# Patient Record
(demographics unavailable — no encounter records)

---

## 2024-11-25 NOTE — CONSULT LETTER
[Dear  ___] : Dear  [unfilled], [Consult Letter:] : I had the pleasure of evaluating your patient, [unfilled]. [Please see my note below.] : Please see my note below. [Consult Closing:] : Thank you very much for allowing me to participate in the care of this patient.  If you have any questions, please do not hesitate to contact me. [Sincerely,] : Sincerely, [FreeTextEntry3] : Tamela Anderson MD Pediatric Endocrinologist Division of Pediatric Endocrinology  [facility] Maternal Fetal Health and Pediatric Specialists at [facility]

## 2024-11-25 NOTE — CONSULT LETTER
[Dear  ___] : Dear  [unfilled], [Consult Letter:] : I had the pleasure of evaluating your patient, [unfilled]. [Please see my note below.] : Please see my note below. [Consult Closing:] : Thank you very much for allowing me to participate in the care of this patient.  If you have any questions, please do not hesitate to contact me. [Sincerely,] : Sincerely, [FreeTextEntry3] : Tamela Anderson MD Pediatric Endocrinologist Division of Pediatric Endocrinology  [facility] Maternal Fetal Health and Pediatric Specialists at [facility]

## 2024-11-25 NOTE — FAMILY HISTORY
[TextEntry] : Father: 48 years old. 5ft9in, 165 lb; dad thinks he had puberty around 13/14 years of age and felt late compared to friends; no PMHx Mother: 47 years old, 5ft4in, 120 lb; menarche at 15yol no PMHx Sister: 8 years old; ADD, seasonal allergies MPH 69in +/- 4in  MGM 5ft7in- 14/15 years when had menarche MGF [id number] (at peak was 6ft) Cora x2: 6ft (oldest), 5ft9in (second oldest) PGM 5ft2in PGF 5ft6in PUncle (older) 5ft7/5ft8in PUncle (younger) 5ft6in  Diabetes: MGM dx at 80 years of age Short Stature: paternal grandparents High Cholesterol: PGF Cancer: MGF Heart disease: PGF Infertility: Paternal grandparents, parents Weight loss surgery: PGM- had gastric lap band Cardiac issues: PGF has a pacemaker  There is no known past medical history of thyroid disease, early puberty, late puberty, disorders of calcium, other hormone conditions, obesity, hypertension, bone disease, liver disease, kidney disease, lung disease, neurological disease (including stroke), birth defects, PCOS, celiac disease, pituitary disease, NAFLD, lupus, RA, Crohn's disease/Ulcerative colitis, MI, COLIN, blood clots, adrenal problems, osteoporosis, frequent fractures.

## 2024-11-25 NOTE — SOCIAL HISTORY
[TextEntry] : - Lives with mom, dad, and sister - He is in 6th grade - Paternal grandfather, Carlita Quiñones, is a retired pediatrician with [employer]

## 2024-11-25 NOTE — FAMILY HISTORY
[TextEntry] : Father: 48 years old. 5ft9in, 165 lb; dad thinks he had puberty around 13/14 years of age and felt late compared to friends; no PMHx Mother: 47 years old, 5ft4in, 120 lb; menarche at 15yol no PMHx Sister: 8 years old; ADD, seasonal allergies MPH 69in +/- 4in  MGM 5ft7in- 14/15 years when had menarche MGF [id number] (at peak was 6ft) Cora x2: 6ft (oldest), 5ft9in (second oldest) PGM 5ft2in PGF 5ft6in PUncle (older) 5ft7/5ft8in PUncle (younger) 5ft6in  Diabetes: MGM dx at 80 years of age Short Stature: paternal grandparents High Cholesterol: PGF Cancer: MGF Heart disease: PGF Infertility: Paternal grandparents, parents Weight loss surgery: PGM- had gastric lap band Cardiac issues: PGF has a pacemaker  There is no known past medical history of thyroid disease, early puberty, late puberty, disorders of calcium, other hormone conditions, obesity, hypertension, bone disease, liver disease, kidney disease, lung disease, neurological disease (including stroke), birth defects, PCOS, celiac disease, pituitary disease, NAFLD, lupus, RA, Crohn's disease/Ulcerative colitis, MI, COLIN, blood clots, adrenal problems, osteoporosis, frequent fractures.

## 2024-11-25 NOTE — HISTORY OF PRESENT ILLNESS
[FreeTextEntry2] : Dano is a 12y1m male here today for an initial visit with pediatric endocrinology for concerns about growth. He is here today with his mom, Kim. He was seen by Dr. Grace for a well child check on 10/31/2024. There were concerns about his growth at that visit. At that visit he was 4 foot 8.5 inches or 143.51 cm which is the 21st 0.1 percentile, weight was 82.6 pounds or 37.467 kg which is 32.8 percentile and BMI was normal at 55th percentile.  Mom reports the growth concerns are due to the concerns about paternal growth. Dad's brother are several inches shorter than him and PGF is 5ft6in and PGM is 5ft/5ft1in. Mom is shortest in her family. She wants to make sure he is healthy and there is noting impacting growth. Mom wants to make sure that if something needs to be assessed that they assess it. He is in 6th grade and older side of his grade and is shorter than some of his friends.   Size 12 clothing is a bit big, size 10/12 clothing still fits him comfortably. He was a big baby at birth. He was big in infancy and then since 4/6 years of age he has been fitting into clothing for his age or smaller. Shoes fit him forever and he will wear them out before he outgrows them. Sometimes they need to buy the same size 3x in a row. His current shoe size is a 4. PGF who is shorter has small feet. His dental eruption timing and loss are normal. He did have to get a few teeth pulled due to long teeth. Two were pulled last spring and haven't grown in yet.   The aripiprazole is new for him [a couple of months] and has been making him crave carbs a bit. For the most part he is a healthy eater. There has been more pizza recently. He is working on making healthy eating choices. The azstarys does not significantly suppress his appetite. Sometimes he does not eat all of his lunch, but eating better the last few months. Never had significant appetite suppression. He was diagnosed with ADD in 1st or 2nd grade. His medications are prescribed by Dr. Branham.   He has a normal energy level and sleeps well. No headaches, abdominal pain, vision is normal w/ contacts. No polyuria, polydipsia, constipation, changes in skin color, salt craving, hearing and sense of smell are normal, chest pain, palpitations, shortness of breath, MSK or joint pains, brittle nails, heat or cold intolerance, dry skin, hair loss.   He does have body odor which started at the end of last school year- started wearing deodorant at camp. No axillary or pubic hair. He had a pimple on his cheek last week.   Immunizations: - UTD  Hospitalizations: - None  Fractures or Concussions: - 8/2024- playing hockey at camp he got hit in the groin with a puck and fell backwards and hit his head 9was wearing a helmet) and was seen in the ED. MRI was normal. No sequaele.  Bone Ages: - Bone Age (11/14/2024): I have independently reviewed the bone age x-ray according to the [place] of [employer]. It is between the 11y6m to 12y6m standards [radiology read of 12y which I would agree with], vs chronological age 12y1m. The bone age is appropriate for age.  Predicted adult height using 12y is 67.8in +/- 2in which is within range of MPH of 69in +/- 4in.  Growth Chart Review: - Weight: 10 years of age between 25th and 50th percentiles closer to 50th, 11 years of age closer to 25th percentile [still between 25th-50th], and then 12 years of age similar slightly higher than 11 years of age - Height: First point at 10 years of age where he was between the 25th and 50th percentiles, 11 years of age he was at the 25th percentile, 12 years of age he was just below the 25th percentile - BMI: Normal, fluctuated around the 50th percentile from 10-12 years of age

## 2024-11-25 NOTE — PHYSICAL EXAM
[Acanthosis Nigricans___] : no acanthosis nigricans [12 yr Molar Eruption] : none of the 12 year molars have erupted [Goiter] : no goiter [Murmur] : no murmurs [Scoliosis] : scoliosis not appreciated [de-identified] : No hyperpigmentation noted [de-identified] : PERRL, sclera clear [FreeTextEntry1] : None [de-identified] : 2+ patellar reflexes [de-identified] : Normal carrying angle and no madelung deformity [TextEntry] : Armspan: 143.8cm UE:LE Ratio: 0.91 [UE: 68.31cm, LE 75.3cm]

## 2024-11-25 NOTE — REVIEW OF SYSTEMS
[TextEntry] : Review of systems positive for: wearing glasses/contacts  Review of systems negative for weight loss, weight gain, fatigue, appetite changes, difficulty with sleep, cold sensitivity, increased thirst, increased urination, waking at night to urinate, unexplained fevers, headaches, loss of consciousness, seizures, blurred vision, double vision, hearing problems, low or no sense of smell, heart murmur, palpitations, snoring, shortness of breath, abdominal pain, nausea/vomiting, constipation, joint pain, muscle pain, hair loss, rashes, dry skin, acne, easy bruising, anxiety, depression, behavioral concerns, pubic hair before 9 years, body odor before 9 years, breast development

## 2024-11-25 NOTE — SIGNATURES
[TextEntry] : Tamela Anderson MD Pediatric Endocrinologist Division of Pediatric Endocrinology  [facility] Maternal Fetal Health and Pediatric Specialists at [facility]

## 2024-11-25 NOTE — SIGNATURES
[TextEntry] : Tamela Anderson MD Pediatric Endocrinologist Division of Pediatric Endocrinology  [facility] Maternal Fetal Health and Pediatric Specialists at [facility]

## 2024-11-25 NOTE — ASSESSMENT
[FreeTextEntry1] : Dano is a amy 12y1m boy here today for an initial visit with pediatric endocrinology for concerns about growth and familal short stature. Reviewed normal pubertal timing and the timing of the puebrtal growth spurt. Reviewed his growth parameters and pubertal exam today. He is prepubertal with regards to central puberty but based on testicular volume will likely start puberty soon.   Based on initial assessment and interval growth in the next 4 months, I will determine whether close monitoring of growth/puberty is warranted or additional work-up is needed. I discussed with the family the work-up for short stature and possible options for further evaluation in the future, usually considered after some observation of growth to establish growth velocity by serial measurements in my office.  - In order to further evaluate Dano's growth concerns, I recommended that he have a bone age x-ray of his hand. The implications of bone age on adult height/remaining time to grow were reviewed. I also recommended that screening growth-related blood tests, including CBC, CMP, CRP, ESR, celiac serologies, thyroid function tests, prolactin, IGF-1 and IGFBP-3 be sent. - PAH with bone age today and discussed with Emmy and mom. - RTC for follow up in 3 months to monitor growth.

## 2024-11-25 NOTE — SOCIAL HISTORY
[TextEntry] : - Lives with mom, dad, and sister - He is in 6th grade - Paternal grandfather, Carlita Quiñones, is a retired pediatrician with [employer]

## 2024-11-25 NOTE — HISTORY OF PRESENT ILLNESS
[FreeTextEntry2] : Dano is a 12y1m male here today for an initial visit with pediatric endocrinology for concerns about growth. He is here today with his mom, Kim. He was seen by Dr. Grace for a well child check on 10/31/2024. There were concerns about his growth at that visit. At that visit he was 4 foot 8.5 inches or 143.51 cm which is the 21st 0.1 percentile, weight was 82.6 pounds or 37.467 kg which is 32.8 percentile and BMI was normal at 55th percentile.  Mom reports the growth concerns are due to the concerns about paternal growth. Dad's brother are several inches shorter than him and PGF is 5ft6in and PGM is 5ft/5ft1in. Mom is shortest in her family. She wants to make sure he is healthy and there is noting impacting growth. Mom wants to make sure that if something needs to be assessed that they assess it. He is in 6th grade and older side of his grade and is shorter than some of his friends.   Size 12 clothing is a bit big, size 10/12 clothing still fits him comfortably. He was a big baby at birth. He was big in infancy and then since 4/6 years of age he has been fitting into clothing for his age or smaller. Shoes fit him forever and he will wear them out before he outgrows them. Sometimes they need to buy the same size 3x in a row. His current shoe size is a 4. PGF who is shorter has small feet. His dental eruption timing and loss are normal. He did have to get a few teeth pulled due to long teeth. Two were pulled last spring and haven't grown in yet.   The aripiprazole is new for him [a couple of months] and has been making him crave carbs a bit. For the most part he is a healthy eater. There has been more pizza recently. He is working on making healthy eating choices. The azstarys does not significantly suppress his appetite. Sometimes he does not eat all of his lunch, but eating better the last few months. Never had significant appetite suppression. He was diagnosed with ADD in 1st or 2nd grade. His medications are prescribed by Dr. Branham.   He has a normal energy level and sleeps well. No headaches, abdominal pain, vision is normal w/ contacts. No polyuria, polydipsia, constipation, changes in skin color, salt craving, hearing and sense of smell are normal, chest pain, palpitations, shortness of breath, MSK or joint pains, brittle nails, heat or cold intolerance, dry skin, hair loss.   He does have body odor which started at the end of last school year- started wearing deodorant at camp. No axillary or pubic hair. He had a pimple on his cheek last week.   Immunizations: - UTD  Hospitalizations: - None  Fractures or Concussions: - 8/2024- playing hockey at camp he got hit in the groin with a puck and fell backwards and hit his head 9was wearing a helmet) and was seen in the ED. MRI was normal. No sequaele.  Bone Ages: - Bone Age (11/14/2024): I have independently reviewed the bone age x-ray according to the [place] of [employer]. It is between the 11y6m to 12y6m standards [radiology read of 12y which I would agree with], vs chronological age 12y1m. The bone age is appropriate for age.  Predicted adult height using 12y is 67.8in +/- 2in which is within range of MPH of 69in +/- 4in.  Growth Chart Review: - Weight: 10 years of age between 25th and 50th percentiles closer to 50th, 11 years of age closer to 25th percentile [still between 25th-50th], and then 12 years of age similar slightly higher than 11 years of age - Height: First point at 10 years of age where he was between the 25th and 50th percentiles, 11 years of age he was at the 25th percentile, 12 years of age he was just below the 25th percentile - BMI: Normal, fluctuated around the 50th percentile from 10-12 years of age

## 2024-11-25 NOTE — PHYSICAL EXAM
[Acanthosis Nigricans___] : no acanthosis nigricans [12 yr Molar Eruption] : none of the 12 year molars have erupted [Goiter] : no goiter [Murmur] : no murmurs [Scoliosis] : scoliosis not appreciated [de-identified] : No hyperpigmentation noted [de-identified] : PERRL, sclera clear [FreeTextEntry1] : None [de-identified] : 2+ patellar reflexes [de-identified] : Normal carrying angle and no madelung deformity [TextEntry] : Armspan: 143.8cm UE:LE Ratio: 0.91 [UE: 68.31cm, LE 75.3cm]

## 2025-01-12 NOTE — CONSULT LETTER
[Dear  ___] : Dear  [unfilled], [Courtesy Letter:] : I had the pleasure of seeing your patient, [unfilled], in my office today. [Please see my note below.] : Please see my note below. [Consult Closing:] : Thank you very much for allowing me to participate in the care of this patient.  If you have any questions, please do not hesitate to contact me. [Sincerely,] : Sincerely, [FreeTextEntry3] : Tamela Anderson MD Pediatric Endocrinologist Division of Pediatric Endocrinology  [facility] Maternal Fetal Health and Pediatric Specialists at [facility]

## 2025-01-12 NOTE — CONSULT LETTER
[Dear  ___] : Dear  [unfilled], [Courtesy Letter:] : I had the pleasure of seeing your patient, [unfilled], in my office today. [Please see my note below.] : Please see my note below. [Consult Closing:] : Thank you very much for allowing me to participate in the care of this patient.  If you have any questions, please do not hesitate to contact me. [Sincerely,] : Sincerely, [FreeTextEntry3] : Tamela Anderson MD Pediatric Endocrinologist Division of Pediatric Endocrinology  [facility] Maternal Fetal Health and Pediatric Specialists at [facility]

## 2025-01-12 NOTE — ASSESSMENT
[FreeTextEntry1] : Dano is a 12y2m male who's parents participated in a telemedicine visit today to review his results. I reviewed his bone age height prediction which was appropriate for the family and I also reviewed his lab results which were reassuring and showed normal IGF-1 and IGF-BP3 levels. There is also familial short stature on the paternal side.   - Reviewed results with parents and I discussed recommendation to monitor his growth velocity at his next appointment with me in February. I reviewed that assessment of growth over time is also an important component of the growth evaluation.   - RTC for follow up as scheduled on 2/20/2025.

## 2025-01-12 NOTE — PAST MEDICAL HISTORY
[At Term] : at term [ Section] : by  section [None] : there were no delivery complications [de-identified] : due to cord [FreeTextEntry1] : [id number]

## 2025-01-12 NOTE — FAMILY HISTORY
[TextEntry] : From 11/21/2024, no updates today Father: 48 years old. 5ft9in, 165 lb; dad thinks he had puberty around 13/14 years of age and felt late compared to friends; no PMHx Mother: 47 years old, 5ft4in, 120 lb; menarche at 15yol no PMHx Sister: 8 years old; ADD, seasonal allergies MPH 69in +/- 4in  MGM 5ft7in- 14/15 years when had menarche MGF [id number] (at peak was 6ft) Cora x2: 6ft (oldest), 5ft9in (second oldest) PGM 5ft2in PGF 5ft6in PUncle (older) 5ft7/5ft8in PUncle (younger) 5ft6in  Diabetes: MGM dx at 80 years of age Short Stature: paternal grandparents High Cholesterol: PGF Cancer: MGF Heart disease: PGF Infertility: Paternal grandparents, parents Weight loss surgery: PGM- had gastric lap band Cardiac issues: PGF has a pacemaker  There is no known past medical history of thyroid disease, early puberty, late puberty, disorders of calcium, other hormone conditions, obesity, hypertension, bone disease, liver disease, kidney disease, lung disease, neurological disease (including stroke), birth defects, PCOS, celiac disease, pituitary disease, NAFLD, lupus, RA, Crohn's disease/Ulcerative colitis, MI, COLIN, blood clots, adrenal problems, osteoporosis, frequent fractures.

## 2025-01-12 NOTE — PAST MEDICAL HISTORY
[At Term] : at term [ Section] : by  section [None] : there were no delivery complications [de-identified] : due to cord [FreeTextEntry1] : [id number]

## 2025-01-12 NOTE — HISTORY OF PRESENT ILLNESS
[Medical Office: ([facility])___] : at the medical office located in  [Parents] : parents [FreeTextEntry3] : Mother and Father [FreeTextEntry2] : Dano is a 12y2m male with concerns about growth here today for follow up to review lab results. He was last seen by me for an initial visit on 11/21/2025. His parents alone participated in the telemedicine visit with me today.    I reviewed with his parents the bone age done prior to the initial visit with me and the labs done at the visit with the interpretations noted below. Dano was stressed with the lab draw.   Dad reports that he was a later tomás for puberty and does not remember having a rapid growth spurt, only steady growth.    Bone Ages: - Bone Age (11/14/2024): I have independently reviewed the bone age x-ray according to the [place] of [employer]. It is between the 11y6m to 12y6m standards [radiology read of 12y which I would agree with], vs chronological age 12y1m. The bone age is appropriate for age. Predicted adult height using 12y is 67.8in +/- 2in which is within range of [facility] of 69in +/- 4in.  Labs collected November 21, 2024 at 3:48 PM reviewed: - IGF-I level 155 ng/mL (reference range for Peña I is 93-3 24 with a mean of 188), normal IGF-I level - IGF binding protein 3 3.85 mg/L (reference range is 2.46-6.09), normal IGFBP-3 level - Unremarkable CBC - Prolactin level low at 2.4 ng/mL likely because this was an afternoon collection although would repeat to trend with future labs - TSH 2.74 IU/mL and free T4 1.1 ng/dL, normal thyroid function test - Unremarkable CMP - CRP less than 3 mg/L, normal CRP level - Negative celiac testing with normal IgA level

## 2025-01-12 NOTE — SIGNATURES
[TextEntry] : Tamela Anderson MD Pediatric Endocrinologist Division of Pediatric Endocrinology  [facility] Maternal Fetal Health and Pediatric Specialists at [facility]

## 2025-01-12 NOTE — FAMILY HISTORY
[TextEntry] : From 11/21/2024, no updates today Father: 48 years old. 5ft9in, 165 lb; dad thinks he had puberty around 13/14 years of age and felt late compared to friends; no PMHx Mother: 47 years old, 5ft4in, 120 lb; menarche at 15yol no PMHx Sister: 8 years old; ADD, seasonal allergies MPH 69in +/- 4in  MGM 5ft7in- 14/15 years when had menarche MGF [id number] (at peak was 6ft) Cora x2: 6ft (oldest), 5ft9in (second oldest) PGM 5ft2in PGF 5ft6in PUncle (older) 5ft7/5ft8in PUncle (younger) 5ft6in  Diabetes: MGM dx at 80 years of age Short Stature: paternal grandparents High Cholesterol: PGF Cancer: MGF Heart disease: PGF Infertility: Paternal grandparents, parents Weight loss surgery: PGM- had gastric lap band Cardiac issues: PGF has a pacemaker  There is no known past medical history of thyroid disease, early puberty, late puberty, disorders of calcium, other hormone conditions, obesity, hypertension, bone disease, liver disease, kidney disease, lung disease, neurological disease (including stroke), birth defects, PCOS, celiac disease, pituitary disease, NAFLD, lupus, RA, Crohn's disease/Ulcerative colitis, MI, COLIN, blood clots, adrenal problems, osteoporosis, frequent fractures.

## 2025-01-12 NOTE — HISTORY OF PRESENT ILLNESS
[Medical Office: ([facility])___] : at the medical office located in  [Parents] : parents [FreeTextEntry3] : Mother and Father [FreeTextEntry2] : Dano is a 12y2m male with concerns about growth here today for follow up to review lab results. He was last seen by me for an initial visit on 11/21/2025. His parents alone participated in the telemedicine visit with me today.    I reviewed with his parents the bone age done prior to the initial visit with me and the labs done at the visit with the interpretations noted below. Dano was stressed with the lab draw.   Dad reports that he was a later tomás for puberty and does not remember having a rapid growth spurt, only steady growth.    Bone Ages: - Bone Age (11/14/2024): I have independently reviewed the bone age x-ray according to the [place] of [employer]. It is between the 11y6m to 12y6m standards [radiology read of 12y which I would agree with], vs chronological age 12y1m. The bone age is appropriate for age. Predicted adult height using 12y is 67.8in +/- 2in which is within range of [facility] of 69in +/- 4in.  Labs collected November 21, 2024 at 3:48 PM reviewed: - IGF-I level 155 ng/mL (reference range for Peña I is 93-3 24 with a mean of 188), normal IGF-I level - IGF binding protein 3 3.85 mg/L (reference range is 2.46-6.09), normal IGFBP-3 level - Unremarkable CBC - Prolactin level low at 2.4 ng/mL likely because this was an afternoon collection although would repeat to trend with future labs - TSH 2.74 IU/mL and free T4 1.1 ng/dL, normal thyroid function test - Unremarkable CMP - CRP less than 3 mg/L, normal CRP level - Negative celiac testing with normal IgA level

## 2025-01-12 NOTE — SIGNATURES
[TextEntry] : Tamela Anderson MD Pediatric Endocrinologist Division of Pediatric Endocrinology  [facility] Maternal Fetal Health and Pediatric Specialists at [facility]

## 2025-01-12 NOTE — SOCIAL HISTORY
[TextEntry] : - Lives with mom, dad, and sister - He is in 6th grade - Paternal grandfather, Carlita Quiñones, is a retired pediatrician with [employer] [facility] [employer]

## 2025-01-12 NOTE — SOCIAL HISTORY
[TextEntry] : - Lives with mom, dad, and sister - He is in 6th grade - Paternal grandfather, Carlita Quiñones, is a retired pediatrician with [employer] [facility] [employer]

## 2025-02-28 NOTE — SIGNATURES
[TextEntry] : Tamela Anderson MD Pediatric Endocrinologist Division of Pediatric Endocrinology  [facility] at [facility]

## 2025-02-28 NOTE — REVIEW OF SYSTEMS
[TextEntry] : Review of systems positive for: sleep talking and occasional snoring, wearing contacts  Review of systems negative for weight loss, weight gain, fatigue, appetite changes, difficulty with sleep, cold sensitivity, increased thirst, increased urination, waking at night to urinate, unexplained fevers, headaches, loss of consciousness, seizures, blurred vision, double vision, wears glasses, hearing problems, low or no sense of smell, heart murmur, palpitations, shortness of breath, abdominal pain, nausea/vomiting, constipation, joint pain, muscle pain, hair loss, rashes, dry skin, acne, easy bruising, anxiety, depression, behavioral concerns, pubic hair before 9 years, body odor before 9 years, breast development

## 2025-02-28 NOTE — HISTORY OF PRESENT ILLNESS
[FreeTextEntry2] : Dano is a 12y4m male with concerns about growth here today for follow up.He was last seen by me for an initial visit on 11/21/2025 and then I had a telemedicine visit with his parents on12/31/2024 to review his results. At the last visit he was Peña 1 with 3mL testes.   He's been fairly heathy since the last visit. He's had occasional URI. No new clothing or shoe sizes, clothing is still  fitting well. His bathing suits from the summer was big over the summer and now fitting well.   Mom feels like he has been eating a bit more and less healthy. He never overeats, but is eating more CHO with the abilify since September. Before with the azystaris he would skip lunch sometimes and now he will eat lunch regularly. They are talking about healthy food choices. He gets gassy with lactaid and minimal dairy. He has mac and cheese and ice cream occasionally but he does not have a lot dairy and even w/ lactaid he gets very gassy. They've tried gasex as well. It is starting to bother him. No diarrhea. No abdominal pain, nausea, or vomiting.  No headaches or MSK or joint pain. No cold intolerance, brittle nails, chest pain, palpitations, anxiety, jitteriness, salt craving, or changes in skin color. His energy is normal.   He is doing tennis, basketball and plays the guitar. He is in the school play- [employer].  [place]. His energy level and sleep are normal. Mom notes that he has a low pain tolerance. No new axillary or pubic hair. Body odor started at camp over the summer.   GV: 7.86 cm/yr (90th percentile for age) [2/2025]  Growth Chart Review: from 11/21/2024 - Weight: 10 years of age between 25th and 50th percentiles closer to 50th, 11 years of age closer to 25th percentile [still between 25th-50th], and then 12 years of age similar slightly higher than 11 years of age - Height: First point at 10 years of age where he was between the 25th and 50th percentiles, 11 years of age he was at the 25th percentile, 12 years of age he was just below the 25th percentile - BMI: Normal, fluctuated around the 50th percentile from 10-12 years of age.  Bone Ages: - Bone Age (11/14/2024): I have independently reviewed the bone age x-ray according to the [place] of [employer]. It is between the 11y6m to 12y6m standards [radiology read of 12y which I would agree with], vs chronological age 12y1m. The bone age is appropriate for age. Predicted adult height using 12y is 67.8in +/- 2in which is within range of MPH of 69in +/- 4in.  Labs collected November 21, 2024 at 3:48 PM reviewed: - IGF-I level 155 ng/mL (reference range for Peña I is  with a mean of 188), normal IGF-I level - IGF binding protein 3 3.85 mg/L (reference range is 2.46-6.09), normal IGFBP-3 level - Unremarkable CBC - Prolactin level low at 2.4 ng/mL likely because this was an afternoon collection although would repeat to trend with future labs - TSH 2.74 IU/mL and free T4 1.1 ng/dL, normal thyroid function test - Unremarkable CMP - CRP less than 3 mg/L, normal CRP level - Negative celiac testing with normal IgA level

## 2025-02-28 NOTE — SOCIAL HISTORY
[TextEntry] : - Lives with mom, dad, and sister - He is in 6th grade - Paternal grandfather, Carlita Quiñones, is a retired pediatrician with [employer] - Mom works for [employer] in [facility] [facility] Other

## 2025-02-28 NOTE — ASSESSMENT
Patient would like labs faxed to [facility] at [phone]  Patient is going tomorrow. [FreeTextEntry1] : Dano is a 12y2m male here today for follow up of growth. There is a history of familial short stature on the paternal side. He has an excellent GV today and his screening growth labs and showed normal IGF-1 and IGF-BP3 levels. I discussed his growth parameters and prepubertal status with him and his mom today.  Discussed that being on the later side of normal for puberty can be genetic [dad thinks he was a bit later]. Discussed that true delayed puberty is absence of puberty at 14 years of age.  - Recommended PMD or GI for gassiness. - RTC for follow up in September for monitoring of growth.

## 2025-02-28 NOTE — PAST MEDICAL HISTORY
[At Term] : at term [ Section] : by  section [None] : there were no delivery complications [de-identified] : due to cord [FreeTextEntry1] : [id number]

## 2025-02-28 NOTE — HISTORY OF PRESENT ILLNESS
[FreeTextEntry2] : Dano is a 12y4m male with concerns about growth here today for follow up.He was last seen by me for an initial visit on 11/21/2025 and then I had a telemedicine visit with his parents on12/31/2024 to review his results. At the last visit he was Peña 1 with 3mL testes.   He's been fairly heathy since the last visit. He's had occasional URI. No new clothing or shoe sizes, clothing is still  fitting well. His bathing suits from the summer was big over the summer and now fitting well.   Mom feels like he has been eating a bit more and less healthy. He never overeats, but is eating more CHO with the abilify since September. Before with the azystaris he would skip lunch sometimes and now he will eat lunch regularly. They are talking about healthy food choices. He gets gassy with lactaid and minimal dairy. He has mac and cheese and ice cream occasionally but he does not have a lot dairy and even w/ lactaid he gets very gassy. They've tried gasex as well. It is starting to bother him. No diarrhea. No abdominal pain, nausea, or vomiting.  No headaches or MSK or joint pain. No cold intolerance, brittle nails, chest pain, palpitations, anxiety, jitteriness, salt craving, or changes in skin color. His energy is normal.   He is doing tennis, basketball and plays the guitar. He is in the school play- [employer].  [place] were [place] last [place]. His energy level and sleep are normal. Mom notes that he has a low pain tolerance. No new axillary or pubic hair. Body odor started at camp over the summer.   GV: 7.86 cm/yr (90th percentile for age) [2/2025]  Growth Chart Review: from 11/21/2024 - Weight: 10 years of age between 25th and 50th percentiles closer to 50th, 11 years of age closer to 25th percentile [still between 25th-50th], and then 12 years of age similar slightly higher than 11 years of age - Height: First point at 10 years of age where he was between the 25th and 50th percentiles, 11 years of age he was at the 25th percentile, 12 years of age he was just below the 25th percentile - BMI: Normal, fluctuated around the 50th percentile from 10-12 years of age.  Bone Ages: - Bone Age (11/14/2024): I have independently reviewed the bone age x-ray according to the [place] of [employer]. It is between the 11y6m to 12y6m standards [radiology read of 12y which I would agree with], vs chronological age 12y1m. The bone age is appropriate for age. Predicted adult height using 12y is 67.8in +/- 2in which is within range of MPH of 69in +/- 4in.  Labs collected November 21, 2024 at 3:48 PM reviewed: - IGF-I level 155 ng/mL (reference range for Peña I is  with a mean of 188), normal IGF-I level - IGF binding protein 3 3.85 mg/L (reference range is 2.46-6.09), normal IGFBP-3 level - Unremarkable CBC - Prolactin level low at 2.4 ng/mL likely because this was an afternoon collection although would repeat to trend with future labs - TSH 2.74 IU/mL and free T4 1.1 ng/dL, normal thyroid function test - Unremarkable CMP - CRP less than 3 mg/L, normal CRP level - Negative celiac testing with normal IgA level

## 2025-02-28 NOTE — PAST MEDICAL HISTORY
[At Term] : at term [ Section] : by  section [None] : there were no delivery complications [de-identified] : due to cord [FreeTextEntry1] : [id number]

## 2025-02-28 NOTE — CONSULT LETTER
[Dear  ___] : Dear  [unfilled], [Courtesy Letter:] : I had the pleasure of seeing your patient, [unfilled], in my office today. [Please see my note below.] : Please see my note below. [Consult Closing:] : Thank you very much for allowing me to participate in the care of this patient.  If you have any questions, please do not hesitate to contact me. [Sincerely,] : Sincerely, [FreeTextEntry3] : Tamela Anderson MD Pediatric Endocrinologist Division of Pediatric Endocrinology  [facility] Maternal Fetal [facility] and Pediatric Specialists at [facility]

## 2025-02-28 NOTE — CONSULT LETTER
[Dear  ___] : Dear  [unfilled], [Courtesy Letter:] : I had the pleasure of seeing your patient, [unfilled], in my office today. [Please see my note below.] : Please see my note below. [Consult Closing:] : Thank you very much for allowing me to participate in the care of this patient.  If you have any questions, please do not hesitate to contact me. [Sincerely,] : Sincerely, [FreeTextEntry3] : Tamela Anderson MD Pediatric Endocrinologist Division of Pediatric Endocrinology  [facility] Maternal Fetal Health and Pediatric Specialists at [facility]

## 2025-02-28 NOTE — SIGNATURES
[TextEntry] : Tamela Anderson MD Pediatric Endocrinologist Division of Pediatric Endocrinology  [facility] Maternal Fetal Health and Pediatric Specialists at [facility]

## 2025-02-28 NOTE — SOCIAL HISTORY
[TextEntry] : - Lives with mom, dad, and sister - He is in 6th grade - Paternal grandfather, Carlita Quiñones, is a retired pediatrician with [employer] - Mom works for [email or web address] in [facility]

## 2025-02-28 NOTE — ASSESSMENT
[FreeTextEntry1] : Dano is a 12y2m male here today for follow up of growth. There is a history of familial short stature on the paternal side. He has an excellent GV today and his screening growth labs and showed normal IGF-1 and IGF-BP3 levels. I discussed his growth parameters and prepubertal status with him and his mom today.  Discussed that being on the later side of normal for puberty can be genetic [dad thinks he was a bit later]. Discussed that true delayed puberty is absence of puberty at 14 years of age.  - Recommended PMD or GI for gassiness. - RTC for follow up in September for monitoring of growth.

## 2025-02-28 NOTE — FAMILY HISTORY
[TextEntry] : From 11/21/2024, no updates today Father: 48 years old. 5ft9in, 165 lb; dad thinks he had puberty around 13/14 years of age and felt late compared to friends; no PMHx Mother: 47 years old, 5ft4in, 120 lb; menarche at 15yol no PMHx Sister: 8 years old; ADD, seasonal allergies MPH 69in +/- 4in  MGM 5ft7in- 14/15 years when had menarche MGF [id number] (at peak was 6ft) Cora x2: 6ft (oldest), 5ft9in (second oldest) PGM 5ft2in PGF 5ft6in PUncle (older) 5ft7/5ft8in PUncle (younger) 5ft6in  Diabetes: MGM dx at 80 years of age Short Stature: paternal grandparents High Cholesterol: PGF Cancer: MGF Heart disease: PGF Infertility: Paternal grandparents, parents Weight loss surgery: PGM- had gastric lap band Cardiac issues: PGF has a pacemaker  There is no known past medical history of thyroid disease, early puberty, late puberty, disorders of calcium, other hormone conditions, obesity, hypertension, bone disease, liver disease, kidney disease, lung disease, neurological disease (including stroke), birth defects, PCOS, celiac disease, pituitary disease, NAFLD, lupus, RA, Crohn's disease/Ulcerative colitis, MI, COLIN, blood clots, adrenal problems, osteoporosis, frequent fractures.

## 2025-02-28 NOTE — PHYSICAL EXAM
[Healthy Appearing] : healthy appearing [Well Nourished] : well nourished [Interactive] : interactive [Normal Appearance] : normal appearance [Well formed] : well formed [Normally Set] : normally set [6 yr Molar Erupted] : 6 year molars have erupted [None] : there were no thyroid nodules [Normal S1 and S2] : normal S1 and S2 [Clear to Ausculation Bilaterally] : clear to auscultation bilaterally [Abdomen Soft] : soft [Abdomen Tenderness] : non-tender [] : no hepatosplenomegaly [1] : was Peña stage 1 [___] : [unfilled] [Normal] : normal  [Acanthosis Nigricans___] : no acanthosis nigricans [12 yr Molar Eruption] : none of the 12 year molars have erupted [Goiter] : no goiter [Murmur] : no murmurs [Scoliosis] : scoliosis not appreciated [de-identified] : No hyperpigmentation noted; VANCE on right upper back [de-identified] : PERRL, sclera clear [FreeTextEntry1] : None [de-identified] : 2+ patellar reflexes [de-identified] : Normal carrying angle and no madelung deformity

## 2025-02-28 NOTE — PHYSICAL EXAM
[Healthy Appearing] : healthy appearing [Well Nourished] : well nourished [Interactive] : interactive [Normal Appearance] : normal appearance [Well formed] : well formed [Normally Set] : normally set [6 yr Molar Erupted] : 6 year molars have erupted [None] : there were no thyroid nodules [Normal S1 and S2] : normal S1 and S2 [Clear to Ausculation Bilaterally] : clear to auscultation bilaterally [Abdomen Soft] : soft [Abdomen Tenderness] : non-tender [] : no hepatosplenomegaly [1] : was Peña stage 1 [___] : [unfilled] [Normal] : normal  [Acanthosis Nigricans___] : no acanthosis nigricans [12 yr Molar Eruption] : none of the 12 year molars have erupted [Goiter] : no goiter [Murmur] : no murmurs [Scoliosis] : scoliosis not appreciated [de-identified] : No hyperpigmentation noted; VANCE on right upper back [de-identified] : PERRL, sclera clear [FreeTextEntry1] : None [de-identified] : 2+ patellar reflexes [de-identified] : Normal carrying angle and no madelung deformity